# Patient Record
(demographics unavailable — no encounter records)

---

## 2024-12-30 NOTE — HISTORY OF PRESENT ILLNESS
[de-identified] : 91 yr old female w known SNHL, aided AU -otalgia, tinnitus, dizzy -hx otitis, noise exp, head trauma +FH sister (presby)

## 2024-12-30 NOTE — PHYSICAL EXAM
[] : septum deviated to the left [Normal] : mucosa is normal [Midline] : trachea located in midline position [de-identified] : CI AU

## 2024-12-30 NOTE — ASSESSMENT
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  stable as compared to 2023  continued use of HA annual audio